# Patient Record
Sex: FEMALE | Race: WHITE | NOT HISPANIC OR LATINO | Employment: OTHER | URBAN - METROPOLITAN AREA
[De-identification: names, ages, dates, MRNs, and addresses within clinical notes are randomized per-mention and may not be internally consistent; named-entity substitution may affect disease eponyms.]

---

## 2023-05-11 ENCOUNTER — HOSPITAL ENCOUNTER (OUTPATIENT)
Facility: MEDICAL CENTER | Age: 68
End: 2023-05-12
Attending: STUDENT IN AN ORGANIZED HEALTH CARE EDUCATION/TRAINING PROGRAM | Admitting: HOSPITALIST
Payer: MEDICARE

## 2023-05-11 ENCOUNTER — APPOINTMENT (OUTPATIENT)
Dept: RADIOLOGY | Facility: MEDICAL CENTER | Age: 68
End: 2023-05-11
Attending: STUDENT IN AN ORGANIZED HEALTH CARE EDUCATION/TRAINING PROGRAM
Payer: MEDICARE

## 2023-05-11 DIAGNOSIS — I65.29 INTERNAL CAROTID ARTERY STENOSIS, UNSPECIFIED LATERALITY: ICD-10-CM

## 2023-05-11 DIAGNOSIS — I10 PRIMARY HYPERTENSION: ICD-10-CM

## 2023-05-11 DIAGNOSIS — R29.810 FACIAL DROOP: ICD-10-CM

## 2023-05-11 DIAGNOSIS — G45.9 TIA (TRANSIENT ISCHEMIC ATTACK): ICD-10-CM

## 2023-05-11 DIAGNOSIS — R47.81 SLURRED SPEECH: ICD-10-CM

## 2023-05-11 LAB
ALBUMIN SERPL BCP-MCNC: 4.1 G/DL (ref 3.2–4.9)
ALBUMIN/GLOB SERPL: 1.2 G/DL
ALP SERPL-CCNC: 98 U/L (ref 30–99)
ALT SERPL-CCNC: 14 U/L (ref 2–50)
ANION GAP SERPL CALC-SCNC: 15 MMOL/L (ref 7–16)
APPEARANCE UR: CLEAR
AST SERPL-CCNC: 15 U/L (ref 12–45)
BACTERIA #/AREA URNS HPF: NEGATIVE /HPF
BASOPHILS # BLD AUTO: 1.2 % (ref 0–1.8)
BASOPHILS # BLD: 0.12 K/UL (ref 0–0.12)
BILIRUB SERPL-MCNC: 0.2 MG/DL (ref 0.1–1.5)
BILIRUB UR QL STRIP.AUTO: NEGATIVE
BUN SERPL-MCNC: 19 MG/DL (ref 8–22)
CALCIUM ALBUM COR SERPL-MCNC: 9.3 MG/DL (ref 8.5–10.5)
CALCIUM SERPL-MCNC: 9.4 MG/DL (ref 8.5–10.5)
CHLORIDE SERPL-SCNC: 101 MMOL/L (ref 96–112)
CO2 SERPL-SCNC: 21 MMOL/L (ref 20–33)
COLOR UR: YELLOW
CREAT SERPL-MCNC: 0.66 MG/DL (ref 0.5–1.4)
EKG IMPRESSION: NORMAL
EOSINOPHIL # BLD AUTO: 0.33 K/UL (ref 0–0.51)
EOSINOPHIL NFR BLD: 3.2 % (ref 0–6.9)
EPI CELLS #/AREA URNS HPF: NEGATIVE /HPF
ERYTHROCYTE [DISTWIDTH] IN BLOOD BY AUTOMATED COUNT: 42.9 FL (ref 35.9–50)
EST. AVERAGE GLUCOSE BLD GHB EST-MCNC: 212 MG/DL
GFR SERPLBLD CREATININE-BSD FMLA CKD-EPI: 95 ML/MIN/1.73 M 2
GLOBULIN SER CALC-MCNC: 3.3 G/DL (ref 1.9–3.5)
GLUCOSE SERPL-MCNC: 175 MG/DL (ref 65–99)
GLUCOSE UR STRIP.AUTO-MCNC: NEGATIVE MG/DL
HBA1C MFR BLD: 9 % (ref 4–5.6)
HCT VFR BLD AUTO: 42.8 % (ref 37–47)
HGB BLD-MCNC: 13.9 G/DL (ref 12–16)
HYALINE CASTS #/AREA URNS LPF: NORMAL /LPF
IMM GRANULOCYTES # BLD AUTO: 0.04 K/UL (ref 0–0.11)
IMM GRANULOCYTES NFR BLD AUTO: 0.4 % (ref 0–0.9)
KETONES UR STRIP.AUTO-MCNC: NEGATIVE MG/DL
LEUKOCYTE ESTERASE UR QL STRIP.AUTO: ABNORMAL
LYMPHOCYTES # BLD AUTO: 3.68 K/UL (ref 1–4.8)
LYMPHOCYTES NFR BLD: 35.9 % (ref 22–41)
MAGNESIUM SERPL-MCNC: 1.4 MG/DL (ref 1.5–2.5)
MCH RBC QN AUTO: 28.6 PG (ref 27–33)
MCHC RBC AUTO-ENTMCNC: 32.5 G/DL (ref 33.6–35)
MCV RBC AUTO: 88.1 FL (ref 81.4–97.8)
MICRO URNS: ABNORMAL
MONOCYTES # BLD AUTO: 1.03 K/UL (ref 0–0.85)
MONOCYTES NFR BLD AUTO: 10 % (ref 0–13.4)
NEUTROPHILS # BLD AUTO: 5.05 K/UL (ref 2–7.15)
NEUTROPHILS NFR BLD: 49.3 % (ref 44–72)
NITRITE UR QL STRIP.AUTO: NEGATIVE
NRBC # BLD AUTO: 0 K/UL
NRBC BLD-RTO: 0 /100 WBC
PH UR STRIP.AUTO: 7.5 [PH] (ref 5–8)
PLATELET # BLD AUTO: 310 K/UL (ref 164–446)
PMV BLD AUTO: 10.3 FL (ref 9–12.9)
POTASSIUM SERPL-SCNC: 3.9 MMOL/L (ref 3.6–5.5)
PROT SERPL-MCNC: 7.4 G/DL (ref 6–8.2)
PROT UR QL STRIP: NEGATIVE MG/DL
RBC # BLD AUTO: 4.86 M/UL (ref 4.2–5.4)
RBC # URNS HPF: NORMAL /HPF
RBC UR QL AUTO: NEGATIVE
SODIUM SERPL-SCNC: 137 MMOL/L (ref 135–145)
SP GR UR STRIP.AUTO: 1.01
TROPONIN T SERPL-MCNC: <6 NG/L (ref 6–19)
UROBILINOGEN UR STRIP.AUTO-MCNC: 0.2 MG/DL
WBC # BLD AUTO: 10.3 K/UL (ref 4.8–10.8)
WBC #/AREA URNS HPF: NORMAL /HPF

## 2023-05-11 PROCEDURE — 700105 HCHG RX REV CODE 258: Performed by: STUDENT IN AN ORGANIZED HEALTH CARE EDUCATION/TRAINING PROGRAM

## 2023-05-11 PROCEDURE — 99223 1ST HOSP IP/OBS HIGH 75: CPT | Performed by: HOSPITALIST

## 2023-05-11 PROCEDURE — 83735 ASSAY OF MAGNESIUM: CPT

## 2023-05-11 PROCEDURE — 83036 HEMOGLOBIN GLYCOSYLATED A1C: CPT

## 2023-05-11 PROCEDURE — 70498 CT ANGIOGRAPHY NECK: CPT

## 2023-05-11 PROCEDURE — 70496 CT ANGIOGRAPHY HEAD: CPT

## 2023-05-11 PROCEDURE — 36415 COLL VENOUS BLD VENIPUNCTURE: CPT

## 2023-05-11 PROCEDURE — G0378 HOSPITAL OBSERVATION PER HR: HCPCS

## 2023-05-11 PROCEDURE — 70551 MRI BRAIN STEM W/O DYE: CPT

## 2023-05-11 PROCEDURE — 84484 ASSAY OF TROPONIN QUANT: CPT

## 2023-05-11 PROCEDURE — 80053 COMPREHEN METABOLIC PANEL: CPT

## 2023-05-11 PROCEDURE — 85025 COMPLETE CBC W/AUTO DIFF WBC: CPT

## 2023-05-11 PROCEDURE — 81001 URINALYSIS AUTO W/SCOPE: CPT

## 2023-05-11 PROCEDURE — 99285 EMERGENCY DEPT VISIT HI MDM: CPT

## 2023-05-11 PROCEDURE — 700117 HCHG RX CONTRAST REV CODE 255: Performed by: STUDENT IN AN ORGANIZED HEALTH CARE EDUCATION/TRAINING PROGRAM

## 2023-05-11 PROCEDURE — 93005 ELECTROCARDIOGRAM TRACING: CPT | Performed by: STUDENT IN AN ORGANIZED HEALTH CARE EDUCATION/TRAINING PROGRAM

## 2023-05-11 RX ORDER — ACETAMINOPHEN 325 MG/1
650 TABLET ORAL EVERY 6 HOURS PRN
Status: DISCONTINUED | OUTPATIENT
Start: 2023-05-11 | End: 2023-05-12 | Stop reason: HOSPADM

## 2023-05-11 RX ORDER — POLYETHYLENE GLYCOL 3350 17 G/17G
1 POWDER, FOR SOLUTION ORAL
Status: DISCONTINUED | OUTPATIENT
Start: 2023-05-11 | End: 2023-05-12 | Stop reason: HOSPADM

## 2023-05-11 RX ORDER — EZETIMIBE 10 MG/1
5 TABLET ORAL DAILY
COMMUNITY

## 2023-05-11 RX ORDER — THYROID 60 MG/1
60 TABLET ORAL DAILY
COMMUNITY

## 2023-05-11 RX ORDER — ATORVASTATIN CALCIUM 80 MG/1
80 TABLET, FILM COATED ORAL EVERY EVENING
Status: DISCONTINUED | OUTPATIENT
Start: 2023-05-11 | End: 2023-05-12

## 2023-05-11 RX ORDER — BISACODYL 10 MG
10 SUPPOSITORY, RECTAL RECTAL
Status: DISCONTINUED | OUTPATIENT
Start: 2023-05-11 | End: 2023-05-12 | Stop reason: HOSPADM

## 2023-05-11 RX ORDER — ASPIRIN 81 MG/1
81 TABLET, CHEWABLE ORAL DAILY
Status: DISCONTINUED | OUTPATIENT
Start: 2023-05-12 | End: 2023-05-12 | Stop reason: HOSPADM

## 2023-05-11 RX ORDER — GLIPIZIDE 10 MG/1
10 TABLET, FILM COATED, EXTENDED RELEASE ORAL DAILY
COMMUNITY

## 2023-05-11 RX ORDER — ASPIRIN 300 MG/1
300 SUPPOSITORY RECTAL DAILY
Status: DISCONTINUED | OUTPATIENT
Start: 2023-05-12 | End: 2023-05-12 | Stop reason: HOSPADM

## 2023-05-11 RX ORDER — AMOXICILLIN 250 MG
2 CAPSULE ORAL 2 TIMES DAILY
Status: DISCONTINUED | OUTPATIENT
Start: 2023-05-11 | End: 2023-05-12 | Stop reason: HOSPADM

## 2023-05-11 RX ORDER — ONDANSETRON 4 MG/1
4 TABLET, ORALLY DISINTEGRATING ORAL EVERY 4 HOURS PRN
Status: DISCONTINUED | OUTPATIENT
Start: 2023-05-11 | End: 2023-05-12 | Stop reason: HOSPADM

## 2023-05-11 RX ORDER — SODIUM CHLORIDE 9 MG/ML
1000 INJECTION, SOLUTION INTRAVENOUS ONCE
Status: COMPLETED | OUTPATIENT
Start: 2023-05-11 | End: 2023-05-11

## 2023-05-11 RX ORDER — ONDANSETRON 2 MG/ML
4 INJECTION INTRAMUSCULAR; INTRAVENOUS EVERY 4 HOURS PRN
Status: DISCONTINUED | OUTPATIENT
Start: 2023-05-11 | End: 2023-05-12 | Stop reason: HOSPADM

## 2023-05-11 RX ORDER — CLONIDINE HYDROCHLORIDE 0.1 MG/1
0.1 TABLET ORAL PRN
COMMUNITY

## 2023-05-11 RX ADMIN — SODIUM CHLORIDE 1000 ML: 9 INJECTION, SOLUTION INTRAVENOUS at 19:43

## 2023-05-11 RX ADMIN — IOHEXOL 70 ML: 350 INJECTION, SOLUTION INTRAVENOUS at 20:30

## 2023-05-11 ASSESSMENT — ENCOUNTER SYMPTOMS
ORTHOPNEA: 0
DOUBLE VISION: 0
EYE PAIN: 0
CHILLS: 0
FALLS: 0
WHEEZING: 0
SHORTNESS OF BREATH: 0
PHOTOPHOBIA: 0
ABDOMINAL PAIN: 0
HEADACHES: 0
CONSTIPATION: 0
BLOOD IN STOOL: 0
PALPITATIONS: 0
COUGH: 0
BLURRED VISION: 0
SINUS PAIN: 0
BRUISES/BLEEDS EASILY: 0
WEAKNESS: 0
DEPRESSION: 0
POLYDIPSIA: 0
VOMITING: 0
FEVER: 0
TREMORS: 0
SPEECH CHANGE: 1
HEARTBURN: 0
NECK PAIN: 0
FLANK PAIN: 0
PND: 0
MYALGIAS: 0
DIZZINESS: 1
SORE THROAT: 0
FOCAL WEAKNESS: 1
BACK PAIN: 0
STRIDOR: 0
DIARRHEA: 0
TINGLING: 0
DIAPHORESIS: 0
CLAUDICATION: 0
SPUTUM PRODUCTION: 0
NAUSEA: 0
HALLUCINATIONS: 0
HEMOPTYSIS: 0

## 2023-05-11 ASSESSMENT — LIFESTYLE VARIABLES: SUBSTANCE_ABUSE: 0

## 2023-05-12 ENCOUNTER — PHARMACY VISIT (OUTPATIENT)
Dept: PHARMACY | Facility: MEDICAL CENTER | Age: 68
End: 2023-05-12
Payer: MEDICARE

## 2023-05-12 ENCOUNTER — APPOINTMENT (OUTPATIENT)
Dept: CARDIOLOGY | Facility: MEDICAL CENTER | Age: 68
End: 2023-05-12
Attending: HOSPITALIST
Payer: MEDICARE

## 2023-05-12 VITALS
DIASTOLIC BLOOD PRESSURE: 69 MMHG | HEIGHT: 65 IN | HEART RATE: 66 BPM | SYSTOLIC BLOOD PRESSURE: 159 MMHG | TEMPERATURE: 97.4 F | WEIGHT: 142 LBS | OXYGEN SATURATION: 98 % | BODY MASS INDEX: 23.66 KG/M2 | RESPIRATION RATE: 12 BRPM

## 2023-05-12 LAB
ALBUMIN SERPL BCP-MCNC: 3.6 G/DL (ref 3.2–4.9)
ALBUMIN/GLOB SERPL: 1.2 G/DL
ALP SERPL-CCNC: 80 U/L (ref 30–99)
ALT SERPL-CCNC: 13 U/L (ref 2–50)
ANION GAP SERPL CALC-SCNC: 9 MMOL/L (ref 7–16)
AST SERPL-CCNC: 14 U/L (ref 12–45)
BASOPHILS # BLD AUTO: 1 % (ref 0–1.8)
BASOPHILS # BLD: 0.09 K/UL (ref 0–0.12)
BILIRUB SERPL-MCNC: 0.2 MG/DL (ref 0.1–1.5)
BUN SERPL-MCNC: 14 MG/DL (ref 8–22)
CALCIUM ALBUM COR SERPL-MCNC: 9.3 MG/DL (ref 8.5–10.5)
CALCIUM SERPL-MCNC: 9 MG/DL (ref 8.5–10.5)
CHLORIDE SERPL-SCNC: 104 MMOL/L (ref 96–112)
CHOLEST SERPL-MCNC: 164 MG/DL (ref 100–199)
CO2 SERPL-SCNC: 24 MMOL/L (ref 20–33)
CREAT SERPL-MCNC: 0.58 MG/DL (ref 0.5–1.4)
EOSINOPHIL # BLD AUTO: 0.31 K/UL (ref 0–0.51)
EOSINOPHIL NFR BLD: 3.5 % (ref 0–6.9)
ERYTHROCYTE [DISTWIDTH] IN BLOOD BY AUTOMATED COUNT: 42.9 FL (ref 35.9–50)
GFR SERPLBLD CREATININE-BSD FMLA CKD-EPI: 98 ML/MIN/1.73 M 2
GLOBULIN SER CALC-MCNC: 3 G/DL (ref 1.9–3.5)
GLUCOSE BLD STRIP.AUTO-MCNC: 145 MG/DL (ref 65–99)
GLUCOSE SERPL-MCNC: 160 MG/DL (ref 65–99)
HCT VFR BLD AUTO: 39.5 % (ref 37–47)
HDLC SERPL-MCNC: 70 MG/DL
HGB BLD-MCNC: 12.8 G/DL (ref 12–16)
IMM GRANULOCYTES # BLD AUTO: 0.03 K/UL (ref 0–0.11)
IMM GRANULOCYTES NFR BLD AUTO: 0.3 % (ref 0–0.9)
LDLC SERPL CALC-MCNC: 77 MG/DL
LV EJECT FRACT  99904: 65
LV EJECT FRACT MOD 2C 99903: 67.09
LV EJECT FRACT MOD 4C 99902: 64.69
LV EJECT FRACT MOD BP 99901: 66.9
LYMPHOCYTES # BLD AUTO: 2.82 K/UL (ref 1–4.8)
LYMPHOCYTES NFR BLD: 31.5 % (ref 22–41)
MCH RBC QN AUTO: 29 PG (ref 27–33)
MCHC RBC AUTO-ENTMCNC: 32.4 G/DL (ref 33.6–35)
MCV RBC AUTO: 89.4 FL (ref 81.4–97.8)
MONOCYTES # BLD AUTO: 0.89 K/UL (ref 0–0.85)
MONOCYTES NFR BLD AUTO: 10 % (ref 0–13.4)
NEUTROPHILS # BLD AUTO: 4.8 K/UL (ref 2–7.15)
NEUTROPHILS NFR BLD: 53.7 % (ref 44–72)
NRBC # BLD AUTO: 0 K/UL
NRBC BLD-RTO: 0 /100 WBC
PLATELET # BLD AUTO: 278 K/UL (ref 164–446)
PMV BLD AUTO: 10.5 FL (ref 9–12.9)
POTASSIUM SERPL-SCNC: 4 MMOL/L (ref 3.6–5.5)
PROT SERPL-MCNC: 6.6 G/DL (ref 6–8.2)
RBC # BLD AUTO: 4.42 M/UL (ref 4.2–5.4)
SODIUM SERPL-SCNC: 137 MMOL/L (ref 135–145)
TRIGL SERPL-MCNC: 86 MG/DL (ref 0–149)
WBC # BLD AUTO: 8.9 K/UL (ref 4.8–10.8)

## 2023-05-12 PROCEDURE — 99239 HOSP IP/OBS DSCHRG MGMT >30: CPT | Mod: FS | Performed by: NURSE PRACTITIONER

## 2023-05-12 PROCEDURE — 82962 GLUCOSE BLOOD TEST: CPT

## 2023-05-12 PROCEDURE — G0378 HOSPITAL OBSERVATION PER HR: HCPCS

## 2023-05-12 PROCEDURE — 700102 HCHG RX REV CODE 250 W/ 637 OVERRIDE(OP): Performed by: HOSPITALIST

## 2023-05-12 PROCEDURE — 93306 TTE W/DOPPLER COMPLETE: CPT | Mod: 26 | Performed by: INTERNAL MEDICINE

## 2023-05-12 PROCEDURE — 93306 TTE W/DOPPLER COMPLETE: CPT

## 2023-05-12 PROCEDURE — 85025 COMPLETE CBC W/AUTO DIFF WBC: CPT

## 2023-05-12 PROCEDURE — RXMED WILLOW AMBULATORY MEDICATION CHARGE: Performed by: NURSE PRACTITIONER

## 2023-05-12 PROCEDURE — 80061 LIPID PANEL: CPT

## 2023-05-12 PROCEDURE — 80053 COMPREHEN METABOLIC PANEL: CPT

## 2023-05-12 PROCEDURE — A9270 NON-COVERED ITEM OR SERVICE: HCPCS | Performed by: HOSPITALIST

## 2023-05-12 RX ORDER — ROSUVASTATIN CALCIUM 20 MG/1
20 TABLET, COATED ORAL EVERY EVENING
Qty: 30 TABLET | Refills: 11 | Status: SHIPPED | OUTPATIENT
Start: 2023-05-12

## 2023-05-12 RX ORDER — ASPIRIN 81 MG/1
81 TABLET, CHEWABLE ORAL DAILY
Qty: 100 TABLET | COMMUNITY
Start: 2023-05-13

## 2023-05-12 RX ORDER — ROSUVASTATIN CALCIUM 20 MG/1
20 TABLET, COATED ORAL EVERY EVENING
Status: DISCONTINUED | OUTPATIENT
Start: 2023-05-12 | End: 2023-05-12 | Stop reason: HOSPADM

## 2023-05-12 RX ADMIN — ACETAMINOPHEN 650 MG: 325 TABLET, FILM COATED ORAL at 08:40

## 2023-05-12 RX ADMIN — SENNOSIDES AND DOCUSATE SODIUM 2 TABLET: 50; 8.6 TABLET ORAL at 01:14

## 2023-05-12 RX ADMIN — ASPIRIN 81 MG: 81 TABLET, CHEWABLE ORAL at 05:53

## 2023-05-12 ASSESSMENT — COPD QUESTIONNAIRES
DURING THE PAST 4 WEEKS HOW MUCH DID YOU FEEL SHORT OF BREATH: NONE/LITTLE OF THE TIME
HAVE YOU SMOKED AT LEAST 100 CIGARETTES IN YOUR ENTIRE LIFE: NO/DON'T KNOW
COPD SCREENING SCORE: 2
DO YOU EVER COUGH UP ANY MUCUS OR PHLEGM?: NO/ONLY WITH OCCASIONAL COLDS OR INFECTIONS

## 2023-05-12 ASSESSMENT — PAIN DESCRIPTION - PAIN TYPE: TYPE: ACUTE PAIN

## 2023-05-12 NOTE — DISCHARGE INSTRUCTIONS
"Please follow-up with vascular surgeon when you return to [place].  You have 50% carotid artery stenosis this needs to be managed with statin therapy and vascular surgery to put a stent in your artery in the near future. Transient Ischemic Attack (TIA)  Discharge Instructions    You experienced a Transient Ischemic Attack.  If an artery that supplies brain tissue is blocked for a short time, the blood flow to that area of the brain slows down or stops, which may cause temporary or transient symptoms of a stroke. A TIA is a serious warning sign that you could have a stroke.        Stroke Risk Factors    You are at increased risk of having another stroke event.  See your Patient Stroke Guide to help reduce your stroke risk. These are your specific risk factors:  Age - Over 55  Carotid Stenosis   Diabetes  High blood pressure  High Cholesterol and lipids  Previous TIAs or \"mini strokes\"     Get help right away if you have any signs of a stroke.  \"BE FAST\" is an easy way to remember the main warning signs of a stroke:  B - Balance. Dizziness, sudden trouble walking, or loss of balance.  E - Eyes. Trouble seeing or a change in how you see.  F - Face. Sudden weakness or loss of feeling in the face. The face or eyelid may droop on one side.  A - Arms. Weakness or loss of feeling in an arm. This happens all of a sudden and most often on one side of the body.  S - Speech. Sudden trouble speaking, slurred speech, or trouble understanding what people say.  T - Time. Time to call emergency services. Write down what time symptoms started.      "

## 2023-05-12 NOTE — ED NOTES
Pt resting comfortably on gurney, supine. Bed repositioned into position of comfort. Call light within reach. No other needs at this time.

## 2023-05-12 NOTE — DISCHARGE PLANNING
PM&R referral from Dr. Cruz. Stroke protocol ongoing work up. Physiatry consult pended at this time.

## 2023-05-12 NOTE — ED NOTES
Pt resting comfortably on hospital bed. Call light within reach. Even chest rise and fall visualized. No needs at this time.

## 2023-05-12 NOTE — ASSESSMENT & PLAN NOTE
Patient is past the tPA window  Patient will be placed on continuous cardiac monitoring to evaluate for any arrhythmias  Q4 hours neuro checks  I will order MRI brain  Check 2-D echo  Patient will be started on full dose aspirin and high intensity statin  Allow permissive hypertension  Check TSH, lipid panel and hemoglobin A1c  PTOT and ST evaluation

## 2023-05-12 NOTE — H&P
Hospital Medicine History & Physical Note    Date of Service  5/11/2023    Primary Care Physician  Pcp Not In Computer    Consultants      Specialist Names:     Code Status  Full Code    Chief Complaint  Chief Complaint   Patient presents with    Possible Stroke     Pt presents after she had slurred speech today and one episode of word finding difficulties and significant facial droop. Symptoms have now subsided and pt is able to ambulate without dizziness. Pt has history of strokes/TIA's and takes an aspirin daily.        History of Presenting Illness  Christine Townsend is a 67 y.o. female who presented 5/11/2023 with past medical history of stroke in 2015, hemicolectomy from a severe GI bleed who comes into the hospital for left-sided facial weakness, slurred speech, dizziness and bilateral upper extremity weakness that started 30 minutes prior to arrival to the hospital.  Patient states that lately her blood pressure has been low every morning.  Today she noticed her blood pressure going above 220 and she suddenly started developing the symptoms.  She did have left-sided weakness, left-sided facial droop with her last stroke in 2015.  She denies any nausea, vomiting, fevers, cough, shortness of breath.  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    EKG interpreted by me found normal sinus rhythm, poor R wave progression  CTA of the head was negative  CTA of the neck found 50% stenosis of the ICA    I discussed the plan of care with patient.    Review of Systems  Review of Systems   Constitutional:  Negative for chills, diaphoresis, fever and malaise/fatigue.   HENT:  Negative for congestion, ear discharge, ear pain, hearing loss, nosebleeds, sinus pain, sore throat and tinnitus.    Eyes:  Negative for blurred vision, double vision, photophobia and pain.   Respiratory:  Negative for cough, hemoptysis, sputum production, shortness of breath, wheezing and stridor.    Cardiovascular:   Negative for chest pain, palpitations, orthopnea, claudication, leg swelling and PND.   Gastrointestinal:  Negative for abdominal pain, blood in stool, constipation, diarrhea, heartburn, melena, nausea and vomiting.   Genitourinary:  Negative for dysuria, flank pain, frequency, hematuria and urgency.   Musculoskeletal:  Negative for back pain, falls, joint pain, myalgias and neck pain.   Skin:  Negative for itching and rash.   Neurological:  Positive for dizziness, speech change and focal weakness. Negative for tingling, tremors, weakness and headaches.   Endo/Heme/Allergies:  Negative for environmental allergies and polydipsia. Does not bruise/bleed easily.   Psychiatric/Behavioral:  Negative for depression, hallucinations, substance abuse and suicidal ideas.        Past Medical History  Medical history is reviewed and nonpertinent    Surgical History  Surgical history reviewed and nonpertinent    Family History    Family history reviewed with patient. There is no family history that is pertinent to the chief complaint.     Social History       Allergies  Allergies   Allergen Reactions    Codeine        Medications  None       Physical Exam  Temp:  [36.3 °C (97.4 °F)] 36.3 °C (97.4 °F)  Pulse:  [68-81] 81  Resp:  [16-20] 17  BP: (152-222)/(69-97) 159/75  SpO2:  [91 %-99 %] 96 %  Blood Pressure : (!) 199/84   Temperature: 36.3 °C (97.4 °F)   Pulse: 73   Respiration: 17   Pulse Oximetry: 91 %       Physical Exam  Vitals and nursing note reviewed.   Constitutional:       General: She is not in acute distress.     Appearance: Normal appearance. She is not ill-appearing, toxic-appearing or diaphoretic.   HENT:      Head: Normocephalic and atraumatic.      Nose: No congestion or rhinorrhea.      Mouth/Throat:      Pharynx: No oropharyngeal exudate or posterior oropharyngeal erythema.   Eyes:      General: No scleral icterus.  Neck:      Vascular: No carotid bruit or JVD.   Cardiovascular:      Rate and Rhythm: Normal  rate and regular rhythm.      Pulses: Normal pulses.      Heart sounds: Normal heart sounds. No murmur heard.     No friction rub. No gallop.   Pulmonary:      Effort: Pulmonary effort is normal. No respiratory distress.      Breath sounds: No stridor. No wheezing, rhonchi or rales.   Abdominal:      General: Abdomen is flat. There is no distension.      Palpations: There is no mass.      Tenderness: There is no abdominal tenderness. There is no left CVA tenderness, guarding or rebound.      Hernia: No hernia is present.   Musculoskeletal:         General: No swelling. Normal range of motion.      Cervical back: No rigidity. No muscular tenderness.      Right lower leg: No edema.      Left lower leg: No edema.   Lymphadenopathy:      Cervical: No cervical adenopathy.   Skin:     General: Skin is warm and dry.      Capillary Refill: Capillary refill takes more than 3 seconds.      Coloration: Skin is not jaundiced or pale.      Findings: No bruising or erythema.   Neurological:      Mental Status: She is alert.      Cranial Nerves: Cranial nerve deficit present.      Motor: Weakness present.      Comments: Left-sided facial weakness  Bilateral upper extremity weakness         Laboratory:  Recent Labs     05/11/23 1905   WBC 10.3   RBC 4.86   HEMOGLOBIN 13.9   HEMATOCRIT 42.8   MCV 88.1   MCH 28.6   MCHC 32.5*   RDW 42.9   PLATELETCT 310   MPV 10.3     Recent Labs     05/11/23  1905   SODIUM 137   POTASSIUM 3.9   CHLORIDE 101   CO2 21   GLUCOSE 175*   BUN 19   CREATININE 0.66   CALCIUM 9.4     Recent Labs     05/11/23  1905   ALTSGPT 14   ASTSGOT 15   ALKPHOSPHAT 98   TBILIRUBIN 0.2   GLUCOSE 175*         No results for input(s): NTPROBNP in the last 72 hours.      Recent Labs     05/11/23  1905   TROPONINT <6       Imaging:  CT-CTA NECK WITH & W/O-POST PROCESSING   Final Result      Atherosclerotic plaque without significant stenosis, dissection or occlusion      Mild right hilar adenopathy is nonspecific      Mild  groundglass and air trapping suggests small airways disease. Edema or infection is not excluded            CT-CTA HEAD WITH & W/O-POST PROCESS   Final Result      CT angiogram of the [place] demonstrates no large vessel occlusion or aneurysm      Internal carotid atherosclerosis with stenoses approaching 50% threshold      Moderate white matter hypodensity is present.  This is a nonspecific finding which usually is found to represent chronic microvascular disease in patient's of this demographic.  Demyelination, age indeterminant ischemia and gliosis are also common    possibilities.      MR-BRAIN-W/O    (Results Pending)   EC-ECHOCARDIOGRAM COMPLETE W/O CONT    (Results Pending)       EKG:  I have personally reviewed the images and compared with prior images.    Assessment/Plan:  Justification for Admission Status  I anticipate this patient is appropriate for observation status at this time because TIA    Patient will need a Telemetry bed on MEDICAL service .  The need is secondary to TIA.    * TIA (transient ischemic attack)- (present on admission)  Assessment & Plan  Patient is past the tPA window  Patient will be placed on continuous cardiac monitoring to evaluate for any arrhythmias  Q4 hours neuro checks  I will order MRI brain  Check 2-D echo  Patient will be started on full dose aspirin and high intensity statin  Allow permissive hypertension  Check TSH, lipid panel and hemoglobin A1c  PTOT and ST evaluation        Internal carotid artery stenosis  Assessment & Plan  Greater than 50% stenosis  Outpatient follow-up    Hypertension  Assessment & Plan  Not on home medications        VTE prophylaxis: SCDs/TEDs

## 2023-05-12 NOTE — DISCHARGE SUMMARY
Discharge Summary    CHIEF COMPLAINT ON ADMISSION  Chief Complaint   Patient presents with    Possible Stroke     Pt presents after she had slurred speech today and one episode of word finding difficulties and significant facial droop. Symptoms have now subsided and pt is able to ambulate without dizziness. Pt has history of strokes/TIA's and takes an aspirin daily.        Reason for Admission  ems     Admission Date  5/11/2023    CODE STATUS  Full Code    HPI & HOSPITAL COURSE  Christine Townsend is a 67 y.o. female who presented 5/11/2023 with past medical history of stroke in 2015, hemicolectomy from a severe GI bleed who comes into the hospital for left-sided facial weakness, slurred speech, dizziness and bilateral upper extremity weakness that started 30 minutes prior to arrival to the hospital.  Patient states that lately her blood pressure has been low every morning.  Today she noticed her blood pressure going above 220 and she suddenly started developing the symptoms.  She did have left-sided weakness, left-sided facial droop with her last stroke in 2015.  She denies any nausea, vomiting, fevers, cough, shortness of breath.  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     EKG interpreted by me found normal sinus rhythm, poor R wave progression  CTA of the head was negative    While in ED CTA was positive for 50% stenosis of ICA.   MRI demonstrated no acute intracranial abnormality was notable for age-related volume loss and chronic microvascular ischemic changes.  Echocardiogram demonstrated 65% EF with normal left ventricular size thickness and systolic function.  Lipid and triglyceride levels were normal.  Troponins were negative.     Patient was educated on risk for stroke with documented 50% stenosis of carotid artery.  She was advised to seek vascular surgeon at home in [place] when she returns in several days.   I had long conversation with patient about role of statin  therapy and stroke prevention and was adamant about not going back on atorvastatin.  We negotiated Crestor as indicated for TIA patient requires high intensity statin.  We will start at 10 mg daily for 1 week then increase to 20 mg daily.  Patient agreed to this medication and dosage.  She agreed to follow-up with vascular surgeon and primary care once back in [place].   She has requested discs with her scans and imaging to bring to primary care/vascular surgeon once back in [place] I have requested this disc be printed and discussed with RN who will make sure patient receives it.  Meds will be sent to [facility] as [place] is [place] state.     Case discussed with Patricia Proctor MD    Therefore, she is discharged in good and stable condition to home with close outpatient follow-up.    The patient recovered much more quickly than anticipated on admission.    Discharge Date  5/12/23    FOLLOW UP ITEMS POST DISCHARGE  Follow-up with vascular surgeon in [place] and primary care provider in [place]  Return to hospital if repeat event    DISCHARGE DIAGNOSES  Principal Problem (Resolved):    TIA (transient ischemic attack) (POA: Yes)  Active Problems:    Internal carotid artery stenosis (POA: Unknown)  Resolved Problems:    Hypertension (POA: Unknown)      FOLLOW UP  No future appointments.  No follow-up provider specified.    MEDICATIONS ON DISCHARGE     Medication List        START taking these medications        Instructions   rosuvastatin 20 MG Tabs  Commonly known as: CRESTOR   Take 1 Tablet by mouth every evening. For first week take 1/2 tablet at night.  Then increase to 1 tablet at night  Dose: 20 mg            CHANGE how you take these medications        Instructions   * aspirin EC 81 MG Tbec  What changed: Another medication with the same name was added. Make sure you understand how and when to take each.  Commonly known as: ECOTRIN   Take 81 mg by mouth every day.  Dose: 81 mg     * aspirin 81 MG Chew  chewable tablet  Start taking on: May 13, 2023  What changed: You were already taking a medication with the same name, and this prescription was added. Make sure you understand how and when to take each.  Commonly known as: ASA   Chew 1 Tablet every day.  Dose: 81 mg           * This list has 2 medication(s) that are the same as other medications prescribed for you. Read the directions carefully, and ask your doctor or other care provider to review them with you.                CONTINUE taking these medications        Instructions   [place] Thyroid 60 MG Tabs  Generic drug: thyroid   Take 60 mg by mouth every day.  Dose: 60 mg     CALCIUM-MAGNESIUM PO   Take 1 Tablet by mouth every day.  Dose: 1 Tablet     cloNIDine 0.1 MG Tabs  Commonly known as: CATAPRES   Take 0.1 mg by mouth as needed (hypertension).  Dose: 0.1 mg     ezetimibe 10 MG Tabs  Commonly known as: ZETIA   Take 5 mg by mouth every day. 5 mg = 1/2 tablet  Dose: 5 mg     FOCUSED MIND PO   Take 1 Tablet by mouth every day.  Dose: 1 Tablet     glipiZIDE SR 10 MG Tb24  Commonly known as: GLUCOTROL   Take 10 mg by mouth every day.  Dose: 10 mg     metformin 1000 MG tablet  Commonly known as: GLUCOPHAGE   Take 1,000 mg by mouth 2 times a day.  Dose: 1,000 mg     PRESERVISION AREDS 2+MULTI VIT PO   Take 1 Tablet by mouth every day.  Dose: 1 Tablet     PROBIOTIC PO   Take 1 Capsule by mouth every day.  Dose: 1 Capsule     TURMERIC PO   Take 1 Tablet by mouth every day.  Dose: 1 Tablet     VITAMIN A PO   Take 1 Tablet by mouth every day.  Dose: 1 Tablet     VITAMIN D3 PO   Take 1 Capsule by mouth every day.  Dose: 1 Capsule              Allergies  Allergies   Allergen Reactions    Codeine     Statins [Hmg-Coa-R Inhibitors]      Muscle stiffness, weakness          DIET  Orders Placed This Encounter   Procedures    Diet Order Diet: Regular     Standing Status:   Standing     Number of Occurrences:   1     Order Specific Question:   Diet:     Answer:   Regular [1]        ACTIVITY  As tolerated.  Weight bearing as tolerated    CONSULTATIONS  None    PROCEDURES  None    LABORATORY  Lab Results   Component Value Date    SODIUM 137 05/12/2023    POTASSIUM 4.0 05/12/2023    CHLORIDE 104 05/12/2023    CO2 24 05/12/2023    GLUCOSE 160 (H) 05/12/2023    BUN 14 05/12/2023    CREATININE 0.58 05/12/2023        Lab Results   Component Value Date    WBC 8.9 05/12/2023    HEMOGLOBIN 12.8 05/12/2023    HEMATOCRIT 39.5 05/12/2023    PLATELETCT 278 05/12/2023        Total time of the discharge process was 43 minutes.

## 2023-05-12 NOTE — ED NOTES
Pt resting comfortably on gurney. Call light within reach. Updated on plan of care. No needs at this time.

## 2023-05-12 NOTE — ED NOTES
Med Rec complete per patient's spouse over phone  No oral antibiotics in the last 30 days per spouse  Allergies reviewed  Preferred Pharmacy: [facility]    Patient takes vitamins/supplements throughout the day

## 2023-05-12 NOTE — PROGRESS NOTES
Received report from night shift RN. Assumed patient care at 0715. Assessment completed. A&OX4.      Patient has slight left facial droop but is otherwise neuro assessment is negative.    Bed in lowest position, bed locked, call light within reach.

## 2023-05-12 NOTE — ED PROVIDER NOTES
ED Provider Note    CHIEF COMPLAINT  Chief Complaint   Patient presents with    Possible Stroke     Pt presents after she had slurred speech today and one episode of word finding difficulties and significant facial droop. Symptoms have now subsided and pt is able to ambulate without dizziness. Pt has history of strokes/TIA's and takes an aspirin daily.        EXTERNAL RECORDS REVIEWED  None    HPI/ROS  LIMITATION TO HISTORY   Select: : None  OUTSIDE HISTORIAN(S):  EMS Report included below    Ashlie Townsend is a 67 y.o. female who presents via EMS due to concern for strokelike symptoms.  She said that she has been feeling generally unwell for the past 3 days she says that she has been feeling lightheaded that her cheeks are 'hot' and flushed and she was feeling generally weak.  She said today she felt quite dizzy and took her blood pressure and it was extremely high above 220.  She called her  who was at [employer] at the time and instructed her to take a clonidine as she takes this when her BP is high.  She said that it did help with her blood pressure however during this time she was also having some changes in her speech. She did not have an associated headache.  Her  said that her speech was slurred and she was having some word finding difficulties.  She did not have any associated weakness, numbness.   She states that this started about 30 minutes prior to arrival and her symptoms have significantly improved by her arrival to the emergency department.  She denies any recent illness specifically no fevers, chills, vomiting, UTI symptoms.     She does have a remote history of a stroke with left sided weakness, facial droop in 2015      PAST MEDICAL HISTORY   CVA, HTN    SURGICAL HISTORY  patient denies any surgical history    FAMILY HISTORY  No family history on file.    SOCIAL HISTORY  Social History     Tobacco Use    Smoking status: Not on file    Smokeless tobacco: Not on file   Substance and Sexual  "Activity    Alcohol use: Not on file    Drug use: Not on file    Sexual activity: Not on file       CURRENT MEDICATIONS  Home Medications       Reviewed by Rachel Almazan (Pharmacy Tech) on 05/11/23 at 2313  Med List Status: Complete     Medication Last Dose Status   aspirin EC (ECOTRIN) 81 MG Tablet Delayed Response 5/11/2023 Active   CALCIUM-MAGNESIUM PO 5/11/2023 Active   Cholecalciferol (VITAMIN D3 PO) 5/11/2023 Active   cloNIDine (CATAPRES) 0.1 MG Tab 5/11/2023 Active   ezetimibe (ZETIA) 10 MG Tab 5/11/2023 Active   glipiZIDE SR (GLUCOTROL) 10 MG TABLET SR 24 HR 5/11/2023 Active   metformin (GLUCOPHAGE) 1000 MG tablet 5/11/2023 Active   Misc Natural Products (FOCUSED MIND PO) 5/11/2023 Active   Multiple Vitamins-Minerals (PRESERVISION AREDS 2+MULTI VIT PO) 5/11/2023 Active   Probiotic Product (PROBIOTIC PO) 5/11/2023 Active   thyroid (ARMOUR THYROID) 60 MG Tab 5/11/2023 Active   TURMERIC PO 5/11/2023 Active   VITAMIN A PO 5/11/2023 Active                    ALLERGIES  Allergies   Allergen Reactions    Codeine        PHYSICAL EXAM  VITAL SIGNS: BP (!) 158/88   Pulse 69   Temp 36.3 °C (97.4 °F) (Temporal)   Resp 16   Ht 1.638 m (5' 4.5\")   Wt 64.4 kg (142 lb)   SpO2 95%   BMI 24.00 kg/m²    Constitutional: Awake and alert . Non toxic  HENT: Normal inspection  Dry mucous membranes  Eyes: Normal inspection  Neck: Grossly normal range of motion.  Cardiovascular: Normal heart rate, Normal rhythm.  Symmetric peripheral pulses.   Thorax & Lungs: No respiratory distress, No wheezing, No rales, No rhonchi  Abdomen: Soft, non-distended, nontender to palpation in all 4 quadrants, no mass  Skin: No obvious rash.  Extremities: Warm, well perfused. No clubbing, cyanosis, edema   Neurologic:  A&O x 4. CN II-XII tested normal other than slight left facial droop with flattening of nasolabial fold  Sensation intact to light touch in all extremities.  Motor: Normal tone and bulk. No abnormal movements appreciated. No " pronator drift. Strength tested and 5/5 in bilateral wrist flexion/extension, elbow flexion/extension, shoulder abduction, straight leg raise, knee flexion/extension, ankle dorsiflexion/plantarflexion. Patient ambulates with a steady gait.  Coordination: Finger to nose and heel to shin testing intact bilaterally.  Psychiatric: Normal for situation      DIAGNOSTIC STUDIES / PROCEDURES  EKG  I have independently interpreted this EKG  Normal rate, normal rhythm. Intervals within normal limits. No ST wave elevations or depressions.  Poor R wave progression      LABS  Results for orders placed or performed during the hospital encounter of 05/11/23   CBC WITH DIFFERENTIAL   Result Value Ref Range    WBC 10.3 4.8 - 10.8 K/uL    RBC 4.86 4.20 - 5.40 M/uL    Hemoglobin 13.9 12.0 - 16.0 g/dL    Hematocrit 42.8 37.0 - 47.0 %    MCV 88.1 81.4 - 97.8 fL    MCH 28.6 27.0 - 33.0 pg    MCHC 32.5 (L) 33.6 - 35.0 g/dL    RDW 42.9 35.9 - 50.0 fL    Platelet Count 310 164 - 446 K/uL    MPV 10.3 9.0 - 12.9 fL    Neutrophils-Polys 49.30 44.00 - 72.00 %    Lymphocytes 35.90 22.00 - 41.00 %    Monocytes 10.00 0.00 - 13.40 %    Eosinophils 3.20 0.00 - 6.90 %    Basophils 1.20 0.00 - 1.80 %    Immature Granulocytes 0.40 0.00 - 0.90 %    Nucleated RBC 0.00 /100 WBC    Neutrophils (Absolute) 5.05 2.00 - 7.15 K/uL    Lymphs (Absolute) 3.68 1.00 - 4.80 K/uL    Monos (Absolute) 1.03 (H) 0.00 - 0.85 K/uL    Eos (Absolute) 0.33 0.00 - 0.51 K/uL    Baso (Absolute) 0.12 0.00 - 0.12 K/uL    Immature Granulocytes (abs) 0.04 0.00 - 0.11 K/uL    NRBC (Absolute) 0.00 K/uL   COMP METABOLIC PANEL   Result Value Ref Range    Sodium 137 135 - 145 mmol/L    Potassium 3.9 3.6 - 5.5 mmol/L    Chloride 101 96 - 112 mmol/L    Co2 21 20 - 33 mmol/L    Anion Gap 15.0 7.0 - 16.0    Glucose 175 (H) 65 - 99 mg/dL    Bun 19 8 - 22 mg/dL    Creatinine 0.66 0.50 - 1.40 mg/dL    Calcium 9.4 8.5 - 10.5 mg/dL    AST(SGOT) 15 12 - 45 U/L    ALT(SGPT) 14 2 - 50 U/L    Alkaline  Phosphatase 98 30 - 99 U/L    Total Bilirubin 0.2 0.1 - 1.5 mg/dL    Albumin 4.1 3.2 - 4.9 g/dL    Total Protein 7.4 6.0 - 8.2 g/dL    Globulin 3.3 1.9 - 3.5 g/dL    A-G Ratio 1.2 g/dL   TROPONIN   Result Value Ref Range    Troponin T <6 6 - 19 ng/L   URINALYSIS CULTURE, IF INDICATED    Specimen: Urine, Cath   Result Value Ref Range    Color Yellow     Character Clear     Specific Gravity 1.006 <1.035    Ph 7.5 5.0 - 8.0    Glucose Negative Negative mg/dL    Ketones Negative Negative mg/dL    Protein Negative Negative mg/dL    Bilirubin Negative Negative    Urobilinogen, Urine 0.2 Negative    Nitrite Negative Negative    Leukocyte Esterase Small (A) Negative    Occult Blood Negative Negative    Micro Urine Req Microscopic    URINE MICROSCOPIC (W/UA)   Result Value Ref Range    WBC 0-2 /hpf    RBC 0-2 /hpf    Bacteria Negative None /hpf    Epithelial Cells Negative /hpf    Hyaline Cast 0-2 /lpf   CORRECTED CALCIUM   Result Value Ref Range    Correct Calcium 9.3 8.5 - 10.5 mg/dL   ESTIMATED GFR   Result Value Ref Range    GFR (CKD-EPI) 95 >60 mL/min/1.73 m 2   Hemoglobin A1C   Result Value Ref Range    Glycohemoglobin 9.0 (H) 4.0 - 5.6 %    Est Avg Glucose 212 mg/dL   Magnesium   Result Value Ref Range    Magnesium 1.4 (L) 1.5 - 2.5 mg/dL   EKG   Result Value Ref Range    Report       [facility] Emergency Dept.    Test Date:  2023  Pt Name:    TRENT MARIE                  Department: ER  MRN:        [id number]                      Room:        08  Gender:     Female                       Technician: [id number]  :        [id number]                   Requested By:JOEY VALENTINO  Order #:    [id number]                    Reading MD:    Measurements  Intervals                                Axis  Rate:       76                           P:          60  NM:         153                          QRS:        41  QRSD:       91                           T:          61  QT:         365  QTc:         411    Interpretive Statements  Sinus rhythm  No previous ECG available for comparison           RADIOLOGY  I have independently interpreted the diagnostic imaging associated with this visit and am waiting the final reading from the radiologist.   My preliminary interpretation is as follows: No obvious intracranial bleed  Radiologist interpretation:   CT-CTA NECK WITH & W/O-POST PROCESSING   Final Result      Atherosclerotic plaque without significant stenosis, dissection or occlusion      Mild right hilar adenopathy is nonspecific      Mild groundglass and air trapping suggests small airways disease. Edema or infection is not excluded            CT-CTA HEAD WITH & W/O-POST PROCESS   Final Result      CT angiogram of the [place] demonstrates no large vessel occlusion or aneurysm      Internal carotid atherosclerosis with stenoses approaching 50% threshold      Moderate white matter hypodensity is present.  This is a nonspecific finding which usually is found to represent chronic microvascular disease in patient's of this demographic.  Demyelination, age indeterminant ischemia and gliosis are also common    possibilities.      MR-BRAIN-W/O    (Results Pending)   EC-ECHOCARDIOGRAM COMPLETE W/O CONT    (Results Pending)         COURSE & MEDICAL DECISION MAKING    ED Observation Status? Yes; I am placing the patient in to an observation status due to a diagnostic uncertainty as well as therapeutic intensity. Patient placed in observation status at 7:58 PM, 5/11/2023.     Observation plan is as follows: IV, Labs, CT, Serial Exams    Upon Reevaluation, the patient's condition has: not improved; and will be escalated to hospitalization.    Patient discharged from ED Observation status at 22.12 PM 5/12/2023    INITIAL ASSESSMENT, COURSE AND PLAN  Care Narrative: This is a 67-year-old with a remote history of CVA and hypertension who presents with speech changes and facial droop.  I was called to triage to assess  for acute stroke. Dr. Machado with Neurology also evaluated the patient with me.  She arrives with NIH stroke scale of 1 and by my assessment, her symptoms are improving and mild.  This was not a code stroke activation as patient was not deemed to be a TPA candidate or have signs consistent with large vessel occlusion.  The differentials considered include TIA, hypoglycemia, infection, electrolyte derangement, ingestion among others.  Lab work is overall reassuring she has no electrolyte derangements, EKG nonischemic, no evidence of urinary tract infection.  Troponin negative.  CT head without evidence of intracranial bleed.  CTA of the neck does show 50% stenosis of the ICA but otherwise no large vessel occlusion.  Patient was reevaluated multiple times in the emergency department.  Her blood pressure significantly improved did her neurological exam.  She still have slight flattening of the nasolabial fold though her speech was much clearer.  Her presentation is concerning for TIA.  I will admit to the hospital for MRI and further work-up and management.      HYDRATION: Based on the patient's presentation of Dehydration the patient was given IV fluids. IV Hydration was used because oral hydration was not adequate alone. Upon recheck following hydration, the patient was improved.      DISPOSITION AND DISCUSSIONS  I have discussed management of the patient with the following physicians and SCARLETT's:  Dr. Dimas, Dr. Cruz    Discussion of management with other hospitals or appropriate source(s): None       FINAL DIAGNOSIS  1. Facial droop Acute   2. TIA (transient ischemic attack) Acute   3. Slurred speech Acute   4. Primary hypertension Acute          Electronically signed by: Yajaira Clay M.D., 5/11/2023 7:11 PM

## 2023-05-12 NOTE — PROGRESS NOTES
Brief neurology note      67-year-old female history of CVA in the past with no residual side effects.  Complains of face feeling red and puffy today.  Started having some slurred speech about 45 minutes ago.  Denies any numbness or weakness of extremities.  Severely hypertensive in the 230 systolic.  Quick assessment in the hallway shows may be a mild left facial droop on smiling unclear if this is new or not.  No focal motor weakness.  Not aphasic.  Slightly slurred speech.  Nonfocal examination.  Given that the onset somewhat unclear as she complains of some facial disturbances all day today.  And with the mild symptoms we will cancel stroke alert.  Please call back neurology if need any further exemptions.

## 2023-05-12 NOTE — PROGRESS NOTES
Discharge orders received. IV discontinued. Instructions, medication, and discharge education reviewed with patient by KIMO Rivera. Follow up appointments discussed, patient will follow up with vascular when she gets home.  Patient verbalizes understanding of discharge instructions and medications. Discharge instructions signed.  Medications delivered to patient prior to discharge.

## 2023-05-12 NOTE — ED TRIAGE NOTES
"Chief Complaint   Patient presents with    Possible Stroke     Pt presents after she had slurred speech today and one episode of word finding difficulties and significant facial droop. Symptoms have now subsided and pt is able to ambulate without dizziness. Pt has history of strokes/TIA's and takes an aspirin daily.      BP (!) 222/97   Pulse 79   Temp 36.3 °C (97.4 °F) (Temporal)   Resp 16   Ht 1.638 m (5' 4.5\")   Wt 64.4 kg (142 lb)   SpO2 99%   BMI 24.00 kg/m²         "

## 2023-05-12 NOTE — ED NOTES
Pt ambulatory to bathroom with paramedic student assistance. Provides urine sample. Back to room. Placed back on monitor, call light within reach.

## 2023-05-12 NOTE — ED NOTES
Pt medicated per MAR. Transitioned onto hospital bed for comfort. Call light within reach. No other needs at this time.

## 2023-05-12 NOTE — ED NOTES
Pt resting comfortably on gurney. Continuous cardiac monitoring in place. Call light within reach.